# Patient Record
Sex: MALE | Race: WHITE | NOT HISPANIC OR LATINO
[De-identification: names, ages, dates, MRNs, and addresses within clinical notes are randomized per-mention and may not be internally consistent; named-entity substitution may affect disease eponyms.]

---

## 2023-04-28 ENCOUNTER — APPOINTMENT (OUTPATIENT)
Dept: FAMILY MEDICINE | Facility: CLINIC | Age: 73
End: 2023-04-28

## 2023-05-08 ENCOUNTER — APPOINTMENT (OUTPATIENT)
Dept: FAMILY MEDICINE | Facility: CLINIC | Age: 73
End: 2023-05-08
Payer: COMMERCIAL

## 2023-05-08 VITALS
WEIGHT: 152 LBS | TEMPERATURE: 97.3 F | BODY MASS INDEX: 24.43 KG/M2 | OXYGEN SATURATION: 99 % | HEART RATE: 77 BPM | SYSTOLIC BLOOD PRESSURE: 119 MMHG | HEIGHT: 66 IN | DIASTOLIC BLOOD PRESSURE: 78 MMHG

## 2023-05-08 DIAGNOSIS — Z80.0 FAMILY HISTORY OF MALIGNANT NEOPLASM OF DIGESTIVE ORGANS: ICD-10-CM

## 2023-05-08 DIAGNOSIS — Z86.59 PERSONAL HISTORY OF OTHER MENTAL AND BEHAVIORAL DISORDERS: ICD-10-CM

## 2023-05-08 DIAGNOSIS — Z63.4 DISAPPEARANCE AND DEATH OF FAMILY MEMBER: ICD-10-CM

## 2023-05-08 DIAGNOSIS — Z87.891 PERSONAL HISTORY OF NICOTINE DEPENDENCE: ICD-10-CM

## 2023-05-08 DIAGNOSIS — Z78.9 OTHER SPECIFIED HEALTH STATUS: ICD-10-CM

## 2023-05-08 DIAGNOSIS — Z82.49 FAMILY HISTORY OF ISCHEMIC HEART DISEASE AND OTHER DISEASES OF THE CIRCULATORY SYSTEM: ICD-10-CM

## 2023-05-08 PROCEDURE — 36415 COLL VENOUS BLD VENIPUNCTURE: CPT

## 2023-05-08 PROCEDURE — 99387 INIT PM E/M NEW PAT 65+ YRS: CPT | Mod: 25

## 2023-05-08 PROCEDURE — G0439: CPT | Mod: 25

## 2023-05-08 RX ORDER — OMEGA-3/DHA/EPA/FISH OIL 300-1000MG
CAPSULE ORAL
Refills: 0 | Status: ACTIVE | COMMUNITY

## 2023-05-08 SDOH — SOCIAL STABILITY - SOCIAL INSECURITY: DISSAPEARANCE AND DEATH OF FAMILY MEMBER: Z63.4

## 2023-05-08 NOTE — HISTORY OF PRESENT ILLNESS
[FreeTextEntry1] : establish care  [de-identified] : 74 yo m presents to establish care. \par No complaints today.

## 2023-05-08 NOTE — HEALTH RISK ASSESSMENT
[Good] : ~his/her~  mood as  good [Yes] : Yes [2 - 3 times a week (3 pts)] : 2 - 3  times a week (3 points) [1 or 2 (0 pts)] : 1 or 2 (0 points) [Never (0 pts)] : Never (0 points) [No] : In the past 12 months have you used drugs other than those required for medical reasons? No [0] : 2) Feeling down, depressed, or hopeless: Not at all (0) [PHQ-2 Negative - No further assessment needed] : PHQ-2 Negative - No further assessment needed [Audit-CScore] : 3 [de-identified] : exercises  [de-identified] : fruits, veggies  [[id number]] : 0 [Patient reported colonoscopy was normal] : Patient reported colonoscopy was normal [Hepatitis C test declined] : Hepatitis C test declined [Change in mental status noted] : No change in mental status noted [Language] : denies difficulty with language [None] : None [Alone] : lives alone [Employed] : employed [] :  [Sexually Active] : not sexually active [Feels Safe at Home] : Feels safe at home [Fully functional (bathing, dressing, toileting, transferring, walking, feeding)] : Fully functional (bathing, dressing, toileting, transferring, walking, feeding) [Fully functional (using the telephone, shopping, preparing meals, housekeeping, doing laundry, using] : Fully functional and needs no help or supervision to perform IADLs (using the telephone, shopping, preparing meals, housekeeping, doing laundry, using transportation, managing medications and managing finances) [Reports changes in hearing] : Reports no changes in hearing [Reports changes in vision] : Reports no changes in vision [ColonoscopyDate] : 08/21 [ColonoscopyComments] : follows 3 years regularly, has had polyps removed, fam hx of colon ca, due 8/2024 [FreeTextEntry2] :  at [employer], art   [Former] : Former [15-19] : 15-19 [> 15 Years] : > 15 Years

## 2023-05-16 LAB
25(OH)D3 SERPL-MCNC: 33 NG/ML
ALBUMIN SERPL ELPH-MCNC: 4.5 G/DL
ALP BLD-CCNC: 58 U/L
ALT SERPL-CCNC: 17 U/L
ANION GAP SERPL CALC-SCNC: 10 MMOL/L
AST SERPL-CCNC: 20 U/L
BASOPHILS # BLD AUTO: 0.07 K/UL
BASOPHILS NFR BLD AUTO: 1.2 %
BILIRUB SERPL-MCNC: 0.4 MG/DL
BUN SERPL-MCNC: 26 MG/DL
CALCIUM SERPL-MCNC: 10.1 MG/DL
CHLORIDE SERPL-SCNC: 105 MMOL/L
CHOLEST SERPL-MCNC: 158 MG/DL
CO2 SERPL-SCNC: 27 MMOL/L
CREAT SERPL-MCNC: 1.13 MG/DL
EGFR: 68 ML/MIN/1.73M2
EOSINOPHIL # BLD AUTO: 0.05 K/UL
EOSINOPHIL NFR BLD AUTO: 0.8 %
ESTIMATED AVERAGE GLUCOSE: 114 MG/DL
GLUCOSE SERPL-MCNC: 112 MG/DL
HBA1C MFR BLD HPLC: 5.6 %
HCT VFR BLD CALC: 45.7 %
HDLC SERPL-MCNC: 71 MG/DL
HGB BLD-MCNC: 14.3 G/DL
IMM GRANULOCYTES NFR BLD AUTO: 0.2 %
LDLC SERPL CALC-MCNC: 77 MG/DL
LYMPHOCYTES # BLD AUTO: 1.52 K/UL
LYMPHOCYTES NFR BLD AUTO: 25.2 %
MAN DIFF?: NORMAL
MCHC RBC-ENTMCNC: 30.1 PG
MCHC RBC-ENTMCNC: 31.3 GM/DL
MCV RBC AUTO: 96.2 FL
MONOCYTES # BLD AUTO: 0.57 K/UL
MONOCYTES NFR BLD AUTO: 9.4 %
NEUTROPHILS # BLD AUTO: 3.82 K/UL
NEUTROPHILS NFR BLD AUTO: 63.2 %
NONHDLC SERPL-MCNC: 88 MG/DL
PLATELET # BLD AUTO: 219 K/UL
POTASSIUM SERPL-SCNC: 4.8 MMOL/L
PROT SERPL-MCNC: 6.6 G/DL
PSA SERPL-MCNC: 4.21 NG/ML
RBC # BLD: 4.75 M/UL
RBC # FLD: 14.7 %
SODIUM SERPL-SCNC: 142 MMOL/L
TRIGL SERPL-MCNC: 53 MG/DL
TSH SERPL-ACNC: 1.81 UIU/ML
WBC # FLD AUTO: 6.04 K/UL

## 2023-05-17 ENCOUNTER — NON-APPOINTMENT (OUTPATIENT)
Age: 73
End: 2023-05-17

## 2023-05-17 ENCOUNTER — APPOINTMENT (OUTPATIENT)
Dept: HEART AND VASCULAR | Facility: CLINIC | Age: 73
End: 2023-05-17
Payer: COMMERCIAL

## 2023-05-17 VITALS
OXYGEN SATURATION: 98 % | TEMPERATURE: 97.5 F | HEART RATE: 76 BPM | BODY MASS INDEX: 24.43 KG/M2 | WEIGHT: 152 LBS | SYSTOLIC BLOOD PRESSURE: 116 MMHG | DIASTOLIC BLOOD PRESSURE: 72 MMHG | HEIGHT: 66 IN

## 2023-05-17 PROCEDURE — 99204 OFFICE O/P NEW MOD 45 MIN: CPT | Mod: 25

## 2023-05-17 PROCEDURE — 93000 ELECTROCARDIOGRAM COMPLETE: CPT

## 2023-05-17 NOTE — DISCUSSION/SUMMARY
[FreeTextEntry1] : SOB check a stress echo if able to approve and schedule.\par HLD cont statin, check Ca score \par EKG section of the chart --- secondary to symptoms above an electrocardiogram also known as an EKG was performed.  Risks and benefits discussed with the patient. Patient was given time and privacy to changed into a gown. Shortly after, standard 10 leads were applied and a [employer] system was used to perform the study. The results were subsequently reviewed by attending physician and discussed with the patient. The study showed a normal sinus rhythm and no ST-T suggestive of ischemia. Order for the EKG was placed in the chart. The results were documented. Billing submitted. Emotional support provided.\par  [EKG obtained to assist in diagnosis and management of assessed problem(s)] : EKG obtained to assist in diagnosis and management of assessed problem(s)

## 2023-05-17 NOTE — REASON FOR VISIT
[Symptom and Test Evaluation] : symptom and test evaluation [Hyperlipidemia] : hyperlipidemia [FreeTextEntry1] : 73 year old man comes in for a visit. He is concerned about heart disease. No recent cardiac testing. He is quite active. Occasional dyspnea noted. This is often noted with activity. Symptoms always improve at rest. We are discussing cardiac risk stratification.

## 2023-05-19 ENCOUNTER — NON-APPOINTMENT (OUTPATIENT)
Age: 73
End: 2023-05-19

## 2023-07-05 ENCOUNTER — APPOINTMENT (OUTPATIENT)
Dept: CT IMAGING | Facility: CLINIC | Age: 73
End: 2023-07-05
Payer: SELF-PAY

## 2023-07-05 ENCOUNTER — OUTPATIENT (OUTPATIENT)
Dept: OUTPATIENT SERVICES | Facility: HOSPITAL | Age: 73
LOS: 1 days | End: 2023-07-05

## 2023-07-05 ENCOUNTER — APPOINTMENT (OUTPATIENT)
Dept: HEART AND VASCULAR | Facility: CLINIC | Age: 73
End: 2023-07-05
Payer: COMMERCIAL

## 2023-07-05 VITALS
TEMPERATURE: 97.9 F | SYSTOLIC BLOOD PRESSURE: 120 MMHG | HEIGHT: 66 IN | HEART RATE: 77 BPM | BODY MASS INDEX: 24.65 KG/M2 | OXYGEN SATURATION: 100 % | WEIGHT: 153.38 LBS | DIASTOLIC BLOOD PRESSURE: 77 MMHG

## 2023-07-05 PROCEDURE — 93351 STRESS TTE COMPLETE: CPT

## 2023-07-05 PROCEDURE — 75571 CT HRT W/O DYE W/CA TEST: CPT | Mod: 26

## 2023-07-05 PROCEDURE — 99214 OFFICE O/P EST MOD 30 MIN: CPT

## 2023-07-06 NOTE — DISCUSSION/SUMMARY
[FreeTextEntry1] : SOB stress echo reviewed. Inclined towards a conservative follow up in this patient. We had a careful discussion regarding diet and exercise. Will be happy to re-evaluate.\par HLD ca score reviewed, refill statin and check carotid u/s if able to approve and schedule.\par

## 2023-07-06 NOTE — REASON FOR VISIT
[Symptom and Test Evaluation] : symptom and test evaluation [FreeTextEntry1] : 73 year old man comes in for a visit. He is concerned about heart disease. No recent cardiac testing. He is quite active. Occasional dyspnea noted. This is often noted with activity. Symptoms always improve at rest. We are discussing cardiac risk stratification. He completed a stress echo and ca score.

## 2023-08-07 ENCOUNTER — APPOINTMENT (OUTPATIENT)
Dept: HEART AND VASCULAR | Facility: CLINIC | Age: 73
End: 2023-08-07

## 2023-08-08 ENCOUNTER — APPOINTMENT (OUTPATIENT)
Dept: HEART AND VASCULAR | Facility: CLINIC | Age: 73
End: 2023-08-08
Payer: COMMERCIAL

## 2023-08-08 VITALS
HEIGHT: 66 IN | SYSTOLIC BLOOD PRESSURE: 126 MMHG | WEIGHT: 152 LBS | DIASTOLIC BLOOD PRESSURE: 77 MMHG | HEART RATE: 81 BPM | BODY MASS INDEX: 24.43 KG/M2 | OXYGEN SATURATION: 98 %

## 2023-08-08 PROCEDURE — 99214 OFFICE O/P EST MOD 30 MIN: CPT

## 2023-08-08 PROCEDURE — 93880 EXTRACRANIAL BILAT STUDY: CPT

## 2023-08-09 NOTE — DISCUSSION/SUMMARY
[FreeTextEntry1] : CAD check u/s abn AAA screen if able to approve and schedule. SOB stress test reviewed. Inclined towards a conservative follow up in this patient. We had a careful discussion regarding diet and exercise. Will be happy to re-evaluate. MICHELET PERRY and I discussed his lipid panel and individualized target LDL goal. At this point, will do diet and exercise with anticipation of re-evaluating labs in 3-6 months

## 2023-09-12 ENCOUNTER — NON-APPOINTMENT (OUTPATIENT)
Age: 73
End: 2023-09-12

## 2023-09-18 ENCOUNTER — EMERGENCY (EMERGENCY)
Facility: HOSPITAL | Age: 73
LOS: 1 days | Discharge: HOME | End: 2023-09-18
Admitting: EMERGENCY MEDICINE
Payer: COMMERCIAL

## 2023-09-18 VITALS
SYSTOLIC BLOOD PRESSURE: 119 MMHG | OXYGEN SATURATION: 98 % | RESPIRATION RATE: 18 BRPM | WEIGHT: 149.91 LBS | TEMPERATURE: 98 F | HEART RATE: 80 BPM | DIASTOLIC BLOOD PRESSURE: 78 MMHG

## 2023-09-18 DIAGNOSIS — L29.9 PRURITUS, UNSPECIFIED: ICD-10-CM

## 2023-09-18 DIAGNOSIS — R21 RASH AND OTHER NONSPECIFIC SKIN ERUPTION: ICD-10-CM

## 2023-09-18 PROCEDURE — 99284 EMERGENCY DEPT VISIT MOD MDM: CPT

## 2023-09-18 NOTE — ED PROVIDER NOTE - OBJECTIVE STATEMENT
74 y/o M with no PMH presents to the ED for a rash to the L forearm x1 week that is worsening. Pt now notes similar rash to B/L medial thighs. He initially went to Urgent Care and was prescribed topical Triamcinolone which helped with itchiness but did not resolve the rash. Pt believes he was exposed to something a work but is unsure. He denies fevers, chills, pain, or swelling.

## 2023-09-18 NOTE — ED PROVIDER NOTE - PATIENT PORTAL LINK FT
You can access the FollowMyHealth Patient Portal offered by [facility] by registering at the following website: [email or web address]. By joining [facility]’s FollowMyHealth portal, you will also be able to view your health information using other applications (apps) compatible with our system.

## 2023-09-18 NOTE — ED ADULT TRIAGE NOTE - CHIEF COMPLAINT QUOTE
worsening pruritic rash to lt forearm spreading to bilat inner thigh, originally seen at [facility] dx dermatitis given steroid cream without relief. no known allergies

## 2023-09-18 NOTE — ED PROVIDER NOTE - SKIN, MLM
Maculopapular slightly raised erythematous rash along dorsum of L forearm with small papular rash to distal B/L medial thigh. No excoriations. Areas are blanchable.

## 2023-09-18 NOTE — ED ADULT NURSE NOTE - CHIEF COMPLAINT QUOTE
worsening pruritic rash to lt forearm spreading to bilat inner thigh, originally seen at [facility] dx dermatitis given steroid cream without relief.

## 2023-09-18 NOTE — ED PROVIDER NOTE - CLINICAL SUMMARY MEDICAL DECISION MAKING FREE TEXT BOX
Pt presenting with rash x1 week. The rash does not have a specific infectious appearance. Pt denies any pain. Pt reports some improvement with itchiness after using topical ointment but no resolution of rash. Plan for short course of oral steroids and follow up with dermatology if there is no improvement. Will forward information to . Stable for discharge home.

## 2023-09-21 ENCOUNTER — APPOINTMENT (OUTPATIENT)
Dept: FAMILY MEDICINE | Facility: CLINIC | Age: 73
End: 2023-09-21
Payer: COMMERCIAL

## 2023-09-21 VITALS
WEIGHT: 152.31 LBS | DIASTOLIC BLOOD PRESSURE: 72 MMHG | BODY MASS INDEX: 24.48 KG/M2 | HEART RATE: 72 BPM | OXYGEN SATURATION: 98 % | HEIGHT: 66 IN | TEMPERATURE: 98.4 F | SYSTOLIC BLOOD PRESSURE: 126 MMHG

## 2023-09-21 DIAGNOSIS — R21 RASH AND OTHER NONSPECIFIC SKIN ERUPTION: ICD-10-CM

## 2023-09-21 PROCEDURE — 36415 COLL VENOUS BLD VENIPUNCTURE: CPT

## 2023-09-21 PROCEDURE — 99214 OFFICE O/P EST MOD 30 MIN: CPT | Mod: 25

## 2023-09-27 LAB
ALBUMIN SERPL ELPH-MCNC: 4.5 G/DL
ALP BLD-CCNC: 59 U/L
ALT SERPL-CCNC: 16 U/L
ANION GAP SERPL CALC-SCNC: 12 MMOL/L
AST SERPL-CCNC: 19 U/L
BILIRUB SERPL-MCNC: 0.2 MG/DL
BUN SERPL-MCNC: 27 MG/DL
CALCIUM SERPL-MCNC: 9.8 MG/DL
CHLORIDE SERPL-SCNC: 103 MMOL/L
CHOLEST SERPL-MCNC: 165 MG/DL
CO2 SERPL-SCNC: 24 MMOL/L
CREAT SERPL-MCNC: 1.15 MG/DL
EGFR: 67 ML/MIN/1.73M2
GLUCOSE SERPL-MCNC: 110 MG/DL
HDLC SERPL-MCNC: 88 MG/DL
LDLC SERPL CALC-MCNC: 65 MG/DL
NONHDLC SERPL-MCNC: 78 MG/DL
POTASSIUM SERPL-SCNC: 4.4 MMOL/L
PROT SERPL-MCNC: 6.8 G/DL
PSA FREE FLD-MCNC: 25 %
PSA FREE SERPL-MCNC: 1.1 NG/ML
PSA SERPL-MCNC: 4.46 NG/ML
SODIUM SERPL-SCNC: 139 MMOL/L
TRIGL SERPL-MCNC: 68 MG/DL

## 2023-10-10 ENCOUNTER — TRANSCRIPTION ENCOUNTER (OUTPATIENT)
Age: 73
End: 2023-10-10

## 2023-10-10 ENCOUNTER — APPOINTMENT (OUTPATIENT)
Dept: UROLOGY | Facility: CLINIC | Age: 73
End: 2023-10-10
Payer: COMMERCIAL

## 2023-10-10 VITALS
HEIGHT: 67.5 IN | BODY MASS INDEX: 23.42 KG/M2 | WEIGHT: 151 LBS | HEART RATE: 72 BPM | DIASTOLIC BLOOD PRESSURE: 73 MMHG | SYSTOLIC BLOOD PRESSURE: 121 MMHG | TEMPERATURE: 98.3 F | OXYGEN SATURATION: 98 %

## 2023-10-10 DIAGNOSIS — R31.29 OTHER MICROSCOPIC HEMATURIA: ICD-10-CM

## 2023-10-10 LAB
BILIRUB UR QL STRIP: NORMAL
CLARITY UR: CLEAR
COLLECTION METHOD: NORMAL
GLUCOSE UR-MCNC: NORMAL
HCG UR QL: 0.2 EU/DL
HGB UR QL STRIP.AUTO: ABNORMAL
KETONES UR-MCNC: NORMAL
LEUKOCYTE ESTERASE UR QL STRIP: NORMAL
NITRITE UR QL STRIP: NORMAL
PH UR STRIP: 6
PROT UR STRIP-MCNC: NORMAL
SP GR UR STRIP: 1.01

## 2023-10-10 PROCEDURE — 81003 URINALYSIS AUTO W/O SCOPE: CPT | Mod: QW

## 2023-10-10 PROCEDURE — 99204 OFFICE O/P NEW MOD 45 MIN: CPT

## 2023-10-11 LAB
APPEARANCE: CLEAR
BACTERIA: NEGATIVE /HPF
BILIRUBIN URINE: NEGATIVE
BLOOD URINE: NEGATIVE
CAST: 0 /LPF
COLOR: YELLOW
EPITHELIAL CELLS: 0 /HPF
GLUCOSE QUALITATIVE U: NEGATIVE MG/DL
KETONES URINE: NEGATIVE MG/DL
LEUKOCYTE ESTERASE URINE: NEGATIVE
MICROSCOPIC-UA: NORMAL
NITRITE URINE: NEGATIVE
PH URINE: 6.5
PROTEIN URINE: NEGATIVE MG/DL
RED BLOOD CELLS URINE: 1 /HPF
SPECIFIC GRAVITY URINE: 1.01
UROBILINOGEN URINE: 0.2 MG/DL
WHITE BLOOD CELLS URINE: 0 /HPF

## 2023-10-12 LAB — BACTERIA UR CULT: NORMAL

## 2023-10-13 ENCOUNTER — OUTPATIENT (OUTPATIENT)
Dept: OUTPATIENT SERVICES | Facility: HOSPITAL | Age: 73
LOS: 1 days | End: 2023-10-13

## 2023-10-13 ENCOUNTER — RESULT REVIEW (OUTPATIENT)
Age: 73
End: 2023-10-13

## 2023-10-13 ENCOUNTER — APPOINTMENT (OUTPATIENT)
Dept: MRI IMAGING | Facility: CLINIC | Age: 73
End: 2023-10-13
Payer: COMMERCIAL

## 2023-10-13 PROCEDURE — 72197 MRI PELVIS W/O & W/DYE: CPT | Mod: 26

## 2023-10-13 PROCEDURE — 76498P: CUSTOM | Mod: 26

## 2023-10-18 ENCOUNTER — NON-APPOINTMENT (OUTPATIENT)
Age: 73
End: 2023-10-18

## 2023-10-20 ENCOUNTER — APPOINTMENT (OUTPATIENT)
Dept: UROLOGY | Facility: CLINIC | Age: 73
End: 2023-10-20
Payer: COMMERCIAL

## 2023-10-20 PROCEDURE — 99214 OFFICE O/P EST MOD 30 MIN: CPT | Mod: 95

## 2023-12-07 ENCOUNTER — APPOINTMENT (OUTPATIENT)
Dept: INTERNAL MEDICINE | Facility: CLINIC | Age: 73
End: 2023-12-07
Payer: COMMERCIAL

## 2023-12-07 DIAGNOSIS — Z00.00 ENCOUNTER FOR GENERAL ADULT MEDICAL EXAMINATION W/OUT ABNORMAL FINDINGS: ICD-10-CM

## 2023-12-07 DIAGNOSIS — Z01.818 ENCOUNTER FOR OTHER PREPROCEDURAL EXAMINATION: ICD-10-CM

## 2023-12-07 DIAGNOSIS — B20 HUMAN IMMUNODEFICIENCY VIRUS [HIV] DISEASE: ICD-10-CM

## 2023-12-07 PROCEDURE — 99214 OFFICE O/P EST MOD 30 MIN: CPT | Mod: 25

## 2023-12-07 PROCEDURE — 36415 COLL VENOUS BLD VENIPUNCTURE: CPT

## 2023-12-07 RX ORDER — METHYLPREDNISOLONE 4 MG/1
4 TABLET ORAL
Qty: 1 | Refills: 0 | Status: DISCONTINUED | COMMUNITY
Start: 2023-09-21 | End: 2023-12-07

## 2023-12-08 LAB
ALBUMIN SERPL ELPH-MCNC: 4.7 G/DL
ALP BLD-CCNC: 65 U/L
ALT SERPL-CCNC: 18 U/L
ANION GAP SERPL CALC-SCNC: 12 MMOL/L
APPEARANCE: CLEAR
AST SERPL-CCNC: 20 U/L
BILIRUB SERPL-MCNC: 0.4 MG/DL
BILIRUBIN URINE: NEGATIVE
BLOOD URINE: NEGATIVE
BUN SERPL-MCNC: 25 MG/DL
CALCIUM SERPL-MCNC: 10.2 MG/DL
CD3 CELLS # BLD: 1076 CELLS/UL
CD3 CELLS NFR BLD: 81 %
CD3+CD4+ CELLS # BLD: 483 CELLS/UL
CD3+CD4+ CELLS NFR BLD: 36 %
CD3+CD4+ CELLS/CD3+CD8+ CLL SPEC: 0.84 RATIO
CD3+CD8+ CELLS # SPEC: 574 CELLS/UL
CD3+CD8+ CELLS NFR BLD: 43 %
CHLORIDE SERPL-SCNC: 103 MMOL/L
CO2 SERPL-SCNC: 25 MMOL/L
COLOR: YELLOW
CREAT SERPL-MCNC: 1.08 MG/DL
EGFR: 72 ML/MIN/1.73M2
ESTIMATED AVERAGE GLUCOSE: 114 MG/DL
GLUCOSE QUALITATIVE U: NEGATIVE MG/DL
GLUCOSE SERPL-MCNC: 117 MG/DL
HBA1C MFR BLD HPLC: 5.6 %
HCT VFR BLD CALC: 44.9 %
HGB BLD-MCNC: 15 G/DL
HIV1 RNA # SERPL NAA+PROBE: NORMAL
HIV1 RNA # SERPL NAA+PROBE: NORMAL COPIES/ML
KETONES URINE: NEGATIVE MG/DL
LEUKOCYTE ESTERASE URINE: NEGATIVE
MCHC RBC-ENTMCNC: 30.9 PG
MCHC RBC-ENTMCNC: 33.4 GM/DL
MCV RBC AUTO: 92.4 FL
NITRITE URINE: NEGATIVE
PH URINE: 6.5
PLATELET # BLD AUTO: 238 K/UL
POTASSIUM SERPL-SCNC: 4.3 MMOL/L
PROT SERPL-MCNC: 7 G/DL
PROTEIN URINE: NEGATIVE MG/DL
PSA FREE FLD-MCNC: 27 %
PSA FREE SERPL-MCNC: 1.18 NG/ML
PSA SERPL-MCNC: 4.32 NG/ML
RBC # BLD: 4.86 M/UL
RBC # FLD: 13.4 %
SODIUM SERPL-SCNC: 141 MMOL/L
SPECIFIC GRAVITY URINE: 1.01
UROBILINOGEN URINE: 0.2 MG/DL
VIRAL LOAD INTERP: NORMAL
VIRAL LOAD LOG: NORMAL LG COP/ML
WBC # FLD AUTO: 7.26 K/UL

## 2023-12-11 ENCOUNTER — OUTPATIENT (OUTPATIENT)
Dept: OUTPATIENT SERVICES | Facility: HOSPITAL | Age: 73
LOS: 1 days | End: 2023-12-11
Payer: COMMERCIAL

## 2023-12-11 PROCEDURE — 76377 3D RENDER W/INTRP POSTPROCES: CPT | Mod: 26

## 2023-12-15 ENCOUNTER — NON-APPOINTMENT (OUTPATIENT)
Age: 73
End: 2023-12-15

## 2023-12-15 ENCOUNTER — APPOINTMENT (OUTPATIENT)
Dept: HEART AND VASCULAR | Facility: CLINIC | Age: 73
End: 2023-12-15
Payer: COMMERCIAL

## 2023-12-15 VITALS
WEIGHT: 151 LBS | BODY MASS INDEX: 23.42 KG/M2 | DIASTOLIC BLOOD PRESSURE: 66 MMHG | HEART RATE: 77 BPM | SYSTOLIC BLOOD PRESSURE: 111 MMHG | HEIGHT: 67.5 IN | OXYGEN SATURATION: 98 %

## 2023-12-15 PROCEDURE — 93000 ELECTROCARDIOGRAM COMPLETE: CPT

## 2023-12-15 PROCEDURE — 99214 OFFICE O/P EST MOD 30 MIN: CPT | Mod: 25

## 2023-12-15 NOTE — DISCUSSION/SUMMARY
[Procedure Low Risk] : the procedure risk is low [Patient Low Risk] : the patient is a low surgical risk [Optimized for Surgery] : the patient is optimized for surgery [FreeTextEntry1] : Pre-op From cardiovascular standpoint, Mr. CAUSEY is of acceptable risk for the planned procedure and may move forward without further cardiovascular testing. HLD cont statin EKG SR NO ST T changes

## 2023-12-15 NOTE — ASU PATIENT PROFILE, ADULT - NS PREOP UNDERSTANDS INFO2
No solid food/dairy/candy/gum after midnight Sunday; water allowed before 09:00am Monday; patient reminded to come with photo ID/insurance/credit card; dress comfortable; no jewelries/contact/lens/valuables; no smoking/drinking alcohol/recreational drug use Sunday; escort must have a photo ID; address and callback number given./yes

## 2023-12-15 NOTE — ASU PATIENT PROFILE, ADULT - NSICDXPASTMEDICALHX_GEN_ALL_CORE_FT
PAST MEDICAL HISTORY:  Hyperlipidemia      PAST MEDICAL HISTORY:  Elevated PSA     HIV disease     Hyperlipidemia

## 2023-12-15 NOTE — ASU PATIENT PROFILE, ADULT - FALL HARM RISK - UNIVERSAL INTERVENTIONS
Bed in lowest position, wheels locked, appropriate side rails in place/Call bell, personal items and telephone in reach/Instruct patient to call for assistance before getting out of bed or chair/Non-slip footwear when patient is out of bed/[place] to call system/Physically safe environment - no spills, clutter or unnecessary equipment/Purposeful Proactive Rounding/Room/bathroom lighting operational, light cord in reach Bed in lowest position, wheels locked, appropriate side rails in place/Call bell, personal items and telephone in reach/Instruct patient to call for assistance before getting out of bed or chair/Non-slip footwear when patient is out of bed/[place] to call system/Physically safe environment - no spills, clutter or unnecessary equipment/Purposeful Proactive Rounding/Room/bathroom lighting operational, light cord in reach

## 2023-12-15 NOTE — HISTORY OF PRESENT ILLNESS
[Preoperative Visit] : for a medical evaluation prior to surgery [Scheduled Procedure ___] : a [unfilled] [Date of Surgery ___] : on [unfilled] [Surgeon Name ___] : surgeon: [unfilled] [Fever] : no fever [Chills] : no chills [Fatigue] : no fatigue [Chest Pain] : no chest pain [Cough] : no cough [Dyspnea] : no dyspnea [Dysuria] : no dysuria [Urinary Frequency] : no urinary frequency [Nausea] : no nausea [Vomiting] : no vomiting [Diarrhea] : no diarrhea [Diabetes] : no diabetes [Cardiovascular Disease] : no cardiovascular disease [Pulmonary Disease] : no pulmonary disease [Anti-Platelet Agents] : no anti-platelet agents [Nicotine Dependence] : no nicotine dependence [Alcohol Use] : no  alcohol use [Renal Disease] : no renal disease [GI Disease] : no gastrointestinal disease [Sleep Apnea] : no sleep apnea [Thromboembolic Problems] : no thromboembolic problems [Frequent use of NSAIDs] : no use of NSAIDs [Transfusion Reaction] : no transfusion reaction [Impaired Immunity] : no impaired immunity [Steroid Use in Last 6 Months] : no steroid use in the last six months [Frequent Aspirin Use] : no frequent aspirin use [Prior Anesthesia] : Prior anesthesia [Prev Anesthesia Reaction] : no previous anesthesia reaction [Electrocardiogram] : ~T an ECG ~C was performed [Echocardiogram] : ~T an echocardiogram ~C was performed [Cardiovascular Stress Test] : a cardiac stress test ~T ~C was performed [Good] : Good [Anesthesia Reaction] : no anesthesia reaction [Sudden Death] : no sudden death [Clotting Disorder] : no clotting disorder [Bleeding Disorder] : no bleeding disorder [de-identified] : Dr Douglass [FreeTextEntry1] : Mr. CAUSEY presents for a follow up today. He denies chest pain, dyspnea or palpitations. No issues reported with his medications. Otherwise, he is feeling well.

## 2023-12-17 ENCOUNTER — TRANSCRIPTION ENCOUNTER (OUTPATIENT)
Age: 73
End: 2023-12-17

## 2023-12-18 ENCOUNTER — APPOINTMENT (OUTPATIENT)
Dept: UROLOGY | Facility: AMBULATORY SURGERY CENTER | Age: 73
End: 2023-12-18

## 2023-12-18 ENCOUNTER — TRANSCRIPTION ENCOUNTER (OUTPATIENT)
Age: 73
End: 2023-12-18

## 2023-12-18 ENCOUNTER — RESULT REVIEW (OUTPATIENT)
Age: 73
End: 2023-12-18

## 2023-12-18 ENCOUNTER — OUTPATIENT (OUTPATIENT)
Dept: OUTPATIENT SERVICES | Facility: HOSPITAL | Age: 73
LOS: 1 days | Discharge: HOME | End: 2023-12-18
Payer: COMMERCIAL

## 2023-12-18 VITALS
SYSTOLIC BLOOD PRESSURE: 138 MMHG | TEMPERATURE: 98 F | OXYGEN SATURATION: 99 % | DIASTOLIC BLOOD PRESSURE: 84 MMHG | HEART RATE: 68 BPM | RESPIRATION RATE: 16 BRPM

## 2023-12-18 VITALS
HEIGHT: 67.5 IN | SYSTOLIC BLOOD PRESSURE: 120 MMHG | OXYGEN SATURATION: 99 % | WEIGHT: 152.12 LBS | HEART RATE: 65 BPM | TEMPERATURE: 98 F | RESPIRATION RATE: 18 BRPM | DIASTOLIC BLOOD PRESSURE: 65 MMHG

## 2023-12-18 DIAGNOSIS — Z98.890 OTHER SPECIFIED POSTPROCEDURAL STATES: Chronic | ICD-10-CM

## 2023-12-18 PROCEDURE — 76999F: CUSTOM | Mod: 26

## 2023-12-18 PROCEDURE — 55700: CPT

## 2023-12-18 PROCEDURE — G0416: CPT | Mod: 26

## 2023-12-18 RX ORDER — ROSUVASTATIN CALCIUM 5 MG/1
1 TABLET ORAL
Refills: 0 | DISCHARGE

## 2023-12-18 RX ORDER — FENTANYL CITRATE 50 UG/ML
25 INJECTION INTRAVENOUS
Refills: 0 | Status: DISCONTINUED | OUTPATIENT
Start: 2023-12-18 | End: 2023-12-18

## 2023-12-18 RX ORDER — SODIUM CHLORIDE 9 MG/ML
1000 INJECTION, SOLUTION INTRAVENOUS
Refills: 0 | Status: DISCONTINUED | OUTPATIENT
Start: 2023-12-18 | End: 2023-12-18

## 2023-12-18 RX ORDER — ASPIRIN/CALCIUM CARB/MAGNESIUM 324 MG
1 TABLET ORAL
Refills: 0 | DISCHARGE

## 2023-12-18 RX ORDER — CHONDROITIN SULFATE A SODIUM 100 %
0 POWDER (GRAM) MISCELLANEOUS
Refills: 0 | DISCHARGE

## 2023-12-18 RX ADMIN — SODIUM CHLORIDE 100 MILLILITER(S): 9 INJECTION, SOLUTION INTRAVENOUS at 14:05

## 2023-12-18 NOTE — BRIEF OPERATIVE NOTE - NSICDXBRIEFPROCEDURE_GEN_ALL_CORE_FT
PROCEDURES:  Biopsy, prostate, with integrated MRI-transrectal US guidance 18-Dec-2023 14:06:54  Lilli Root

## 2023-12-18 NOTE — ASU DISCHARGE PLAN (ADULT/PEDIATRIC) - ASU DC SPECIAL INSTRUCTIONSFT
PROSTATE BIOPSY    GENERAL: Expect blood in the urine, stool, and ejaculate for up to two (2) months postoperatively. This is normal and to be expected after this procedure. Increase hydration to keep the urine as clear as possible.    SURGICAL WOUND: There are often lumps and bumps that can be felt in the perineum (skin between scrotum and anus) for up to two (2) months or more post operatively. These are of no concern and with time they will soften and disappear.  Any “black and blue” bruising areas will also resolve.  You may apply an ice-pack for 15 minutes out of every hour for the first 24 -36 hours to minimize pain and swelling. You may apply over the counter Bacitracin to the wound several times a day, and keep covered with over the counter gauze as necessary.    CATHETER: Some patients are sent home with a Yañez catheter, while others go home urinating on their own. A Yañez catheter continuously drains the urine from the bladder. If you still have a catheter, the nurses will review instructions and care before you go home. For men, you may have a prescription for lidocaine jelly to apply to the tip of your penis, as needed, for catheter related discomfort.     PAIN: You may have some intermittent pain for up to six (6) weeks post operatively. Pain does not signify any problem unless associated with fever, chills, or inability to void.  If you experience any fevers or chills please call immediately as this may be signs of an infection. You may take Tylenol (acetaminophen) 650-975mg and/or Motrin (ibuprofen) 400-600mg, both available over the counter, for pain every 6 hours as needed. Do not exceed 4000mg of Tylenol (acetaminophen) daily. You may alternate these medications such that you take one or the other every 3 hours for around the clock pain coverage.     ANTICOAGULATION: If you are taking any blood thinning medications, please discuss with your urologist prior to restarting these medications unless otherwise specified.    BATHING: You may shower with soap and water, and pat dry the needle insertion sites in the perineum. Avoid sitting in water for prolonged periods of time for the next few days.    DIET: You may resume your regular diet and regular medication regimen.    ACTIVITY: No heavy lifting or strenuous exercise until you are evaluated at your post-operative appointment. Otherwise, you may return to your usual level of physical activity.    FOLLOW-UP: If you did not already schedule your post-operative appointment, please call your urologist to schedule and follow-up appointment.    CALL YOUR UROLOGIST IF: You have any bleeding that does not stop, inability to void >8 hours, fever over 100.4 F, chills, persistent nausea/vomiting, changes in your incision concerning for infection, or if your pain is not controlled on your discharge pain medications.

## 2023-12-18 NOTE — PRE-ANESTHESIA EVALUATION ADULT - NSANTHOSAYNRD_GEN_A_CORE
No. OBIE screening performed.  STOP BANG Legend: 0-2 = LOW Risk; 3-4 = INTERMEDIATE Risk; 5-8 = HIGH Risk

## 2023-12-18 NOTE — ASU DISCHARGE PLAN (ADULT/PEDIATRIC) - NS MD DC FALL RISK RISK
For information on Fall & Injury Prevention, visit: [email or web address] OR  [email or web address] OR  https://www.cdc.gov/steadi/patient.html For information on Fall & Injury Prevention, visit: [email or web address] OR  [email or web address] OR  https://www.cdc.gov/steadi/patient.html

## 2023-12-19 ENCOUNTER — NON-APPOINTMENT (OUTPATIENT)
Age: 73
End: 2023-12-19

## 2023-12-21 LAB
SURGICAL PATHOLOGY STUDY: SIGNIFICANT CHANGE UP
SURGICAL PATHOLOGY STUDY: SIGNIFICANT CHANGE UP

## 2023-12-22 ENCOUNTER — NON-APPOINTMENT (OUTPATIENT)
Age: 73
End: 2023-12-22

## 2023-12-22 PROBLEM — R97.20 ELEVATED PROSTATE SPECIFIC ANTIGEN [PSA]: Chronic | Status: ACTIVE | Noted: 2023-12-18

## 2023-12-22 PROBLEM — E78.5 HYPERLIPIDEMIA, UNSPECIFIED: Chronic | Status: ACTIVE | Noted: 2023-12-15

## 2023-12-22 PROBLEM — B20 HUMAN IMMUNODEFICIENCY VIRUS [HIV] DISEASE: Chronic | Status: ACTIVE | Noted: 2023-12-18

## 2024-02-23 ENCOUNTER — APPOINTMENT (OUTPATIENT)
Dept: FAMILY MEDICINE | Facility: CLINIC | Age: 74
End: 2024-02-23
Payer: COMMERCIAL

## 2024-02-23 VITALS
WEIGHT: 153 LBS | DIASTOLIC BLOOD PRESSURE: 81 MMHG | BODY MASS INDEX: 23.73 KG/M2 | HEIGHT: 67.5 IN | HEART RATE: 64 BPM | TEMPERATURE: 97.8 F | OXYGEN SATURATION: 98 % | SYSTOLIC BLOOD PRESSURE: 135 MMHG

## 2024-02-23 DIAGNOSIS — F40.243 FEAR OF FLYING: ICD-10-CM

## 2024-02-23 PROCEDURE — 99214 OFFICE O/P EST MOD 30 MIN: CPT

## 2024-02-23 PROCEDURE — G2211 COMPLEX E/M VISIT ADD ON: CPT

## 2024-02-23 PROCEDURE — 36415 COLL VENOUS BLD VENIPUNCTURE: CPT

## 2024-02-23 RX ORDER — TRIAZOLAM 0.12 MG/1
0.12 TABLET ORAL
Qty: 21 | Refills: 0 | Status: ACTIVE | COMMUNITY
Start: 2024-02-23 | End: 1900-01-01

## 2024-02-27 LAB
ALBUMIN SERPL ELPH-MCNC: 4.5 G/DL
ALP BLD-CCNC: 54 U/L
ALT SERPL-CCNC: 15 U/L
ANION GAP SERPL CALC-SCNC: 19 MMOL/L
AST SERPL-CCNC: 18 U/L
BILIRUB SERPL-MCNC: 0.3 MG/DL
BUN SERPL-MCNC: 25 MG/DL
CALCIUM SERPL-MCNC: 9.9 MG/DL
CHLORIDE SERPL-SCNC: 102 MMOL/L
CHOLEST SERPL-MCNC: 152 MG/DL
CO2 SERPL-SCNC: 20 MMOL/L
CREAT SERPL-MCNC: 1.07 MG/DL
EGFR: 73 ML/MIN/1.73M2
ESTIMATED AVERAGE GLUCOSE: 114 MG/DL
GLUCOSE SERPL-MCNC: 116 MG/DL
HBA1C MFR BLD HPLC: 5.6 %
HDLC SERPL-MCNC: 67 MG/DL
LDLC SERPL CALC-MCNC: 69 MG/DL
NONHDLC SERPL-MCNC: 85 MG/DL
POTASSIUM SERPL-SCNC: 5 MMOL/L
PROT SERPL-MCNC: 6.6 G/DL
SODIUM SERPL-SCNC: 142 MMOL/L
TRIGL SERPL-MCNC: 88 MG/DL

## 2024-03-05 RX ORDER — NIRMATRELVIR AND RITONAVIR 300-100 MG
20 X 150 MG & KIT ORAL
Qty: 1 | Refills: 0 | Status: ACTIVE | COMMUNITY
Start: 2024-03-05 | End: 1900-01-01

## 2024-03-19 ENCOUNTER — APPOINTMENT (OUTPATIENT)
Dept: UROLOGY | Facility: CLINIC | Age: 74
End: 2024-03-19

## 2024-03-20 ENCOUNTER — APPOINTMENT (OUTPATIENT)
Dept: UROLOGY | Facility: CLINIC | Age: 74
End: 2024-03-20
Payer: COMMERCIAL

## 2024-03-20 VITALS
HEART RATE: 74 BPM | DIASTOLIC BLOOD PRESSURE: 86 MMHG | TEMPERATURE: 98.3 F | WEIGHT: 153 LBS | BODY MASS INDEX: 23.73 KG/M2 | HEIGHT: 67.5 IN | SYSTOLIC BLOOD PRESSURE: 136 MMHG

## 2024-03-20 DIAGNOSIS — R97.20 ELEVATED PROSTATE, SPECIFIC ANTIGEN [PSA]: ICD-10-CM

## 2024-03-20 DIAGNOSIS — R39.9 UNSPECIFIED SYMPTOMS AND SIGNS INVOLVING THE GENITOURINARY SYSTEM: ICD-10-CM

## 2024-03-20 PROCEDURE — 99214 OFFICE O/P EST MOD 30 MIN: CPT

## 2024-03-20 NOTE — PHYSICAL EXAM
[Normal Appearance] : normal appearance [General Appearance - In No Acute Distress] : no acute distress [Well Groomed] : well groomed [Edema] : no peripheral edema [Respiration, Rhythm And Depth] : normal respiratory rhythm and effort [Exaggerated Use Of Accessory Muscles For Inspiration] : no accessory muscle use [Abdomen Soft] : soft [Abdomen Tenderness] : non-tender [Costovertebral Angle Tenderness] : no ~M costovertebral angle tenderness [Urinary Bladder Findings] : the bladder was normal on palpation [Normal Station and Gait] : the gait and station were normal for the patient's age [] : no rash [No Focal Deficits] : no focal deficits [Oriented To Time, Place, And Person] : oriented to person, place, and time [Affect] : the affect was normal [Mood] : the mood was normal

## 2024-03-20 NOTE — ASSESSMENT
[FreeTextEntry1] : 73 year old man with elevated PSA 4.46, MRI with 66 cc gland, 9 mm PIRADS 3 lesion in the left PZpm mid. Negative prostate biopsy 12/18/23. Plan for continued PSA screening.   - F/U PSA  - F/U in 1 year if PSA stable

## 2024-03-20 NOTE — HISTORY OF PRESENT ILLNESS
[FreeTextEntry1] : 73 yr old male presents to discuss prostate cancer screening. He had negative prostate biopsy in 12/2023.   PSA was 4.46 on 9/21/23. Prior to that in May 2023 it was 4.21. He denies any changes in his urinary symptoms, hematuria, or FH prostate cancer.   Has baseline LUTS. Nocturia x 2-3, frequency and occasional hesitancy. Denies weak stream or straining. Denies dysuria. He is mildly bothered by his symptoms, though they are stable.  MRI prostate 10/13/23: 66 cc gland, 9 mm PIRADS 3 lesion in the left PZpm mid  Prostate biopsy 12/18/23: Negative  PSA History: 9/21/23-- 4.46 5/8/23-- 4.21

## 2024-03-20 NOTE — LETTER BODY
[Dear  ___] : Dear  [unfilled], [Courtesy Letter:] : I had the pleasure of seeing your patient, [unfilled], in my office today. [Please see my note below.] : Please see my note below. [Consult Closing:] : Thank you very much for allowing me to participate in the care of this patient.  If you have any questions, please do not hesitate to contact me. [Sincerely,] : Sincerely, [FreeTextEntry3] : Farhat Douglass MD

## 2024-03-22 LAB — PSA SERPL-MCNC: 4.5 NG/ML

## 2024-04-22 ENCOUNTER — APPOINTMENT (OUTPATIENT)
Dept: HEART AND VASCULAR | Facility: CLINIC | Age: 74
End: 2024-04-22
Payer: COMMERCIAL

## 2024-04-22 VITALS
WEIGHT: 151.56 LBS | HEIGHT: 67.5 IN | BODY MASS INDEX: 23.51 KG/M2 | DIASTOLIC BLOOD PRESSURE: 69 MMHG | SYSTOLIC BLOOD PRESSURE: 135 MMHG | TEMPERATURE: 97.3 F | HEART RATE: 69 BPM | OXYGEN SATURATION: 100 %

## 2024-04-22 DIAGNOSIS — E78.00 PURE HYPERCHOLESTEROLEMIA, UNSPECIFIED: ICD-10-CM

## 2024-04-22 DIAGNOSIS — R06.02 SHORTNESS OF BREATH: ICD-10-CM

## 2024-04-22 PROCEDURE — 99214 OFFICE O/P EST MOD 30 MIN: CPT

## 2024-04-22 PROCEDURE — G2211 COMPLEX E/M VISIT ADD ON: CPT

## 2024-04-22 RX ORDER — ASPIRIN 81 MG/1
81 TABLET, COATED ORAL DAILY
Qty: 90 | Refills: 3 | Status: ACTIVE | COMMUNITY
Start: 2024-04-22

## 2024-04-22 NOTE — REASON FOR VISIT
[Symptom and Test Evaluation] : symptom and test evaluation [FreeTextEntry1] : 73 year old man with FH of CAD presents for a follow up and re-evaluation. Doing well with 20 mg dose of crestor. No chest pain noted.  Labs from 2/24 show improvement in lipids.

## 2024-04-22 NOTE — DISCUSSION/SUMMARY
[FreeTextEntry1] : CAD via Ca score cont ASA 81 mg QD Borderline HTN low Na diet and monitor HLD cont crestor at current dose and labs later in the year Carotid disease repeat carotid us in 6 months if able to approve and schedule. SOB seems to be better check echo on follow up if able to approve and schedule.

## 2024-05-01 NOTE — PLAN
[FreeTextEntry1] : follow up labs, labs drawn in office  encouraged healthy diet and exercise reviewed prior labs with patient   reviewed risks, benefits, side effects, alternatives, regimen to meds  will take only prn

## 2024-05-01 NOTE — HISTORY OF PRESENT ILLNESS
[FreeTextEntry1] : follow up  bp check  chol check   [de-identified] : 72 yo m presents to follow up chol, bp  Here to discuss cholesterol-- has been seeing cardio, on cholesterol meds.  Has been trying to eat well  also with anxiety when flying, interested in medication options

## 2024-05-01 NOTE — HEALTH RISK ASSESSMENT
[0] : 2) Feeling down, depressed, or hopeless: Not at all (0) [PHQ-2 Negative - No further assessment needed] : PHQ-2 Negative - No further assessment needed [Former] : Former [10-14] : 10-14 [> 15 Years] : > 15 Years [[id number]] : 0

## 2024-07-30 ENCOUNTER — APPOINTMENT (OUTPATIENT)
Dept: OTOLARYNGOLOGY | Facility: CLINIC | Age: 74
End: 2024-07-30
Payer: COMMERCIAL

## 2024-07-30 VITALS
OXYGEN SATURATION: 96 % | TEMPERATURE: 97.4 F | HEIGHT: 67.5 IN | BODY MASS INDEX: 23.42 KG/M2 | HEART RATE: 97 BPM | SYSTOLIC BLOOD PRESSURE: 116 MMHG | DIASTOLIC BLOOD PRESSURE: 76 MMHG | WEIGHT: 151 LBS

## 2024-07-30 DIAGNOSIS — H61.23 IMPACTED CERUMEN, BILATERAL: ICD-10-CM

## 2024-07-30 DIAGNOSIS — F45.8 OTHER SOMATOFORM DISORDERS: ICD-10-CM

## 2024-07-30 DIAGNOSIS — H90.3 SENSORINEURAL HEARING LOSS, BILATERAL: ICD-10-CM

## 2024-07-30 PROCEDURE — 92550 TYMPANOMETRY & REFLEX THRESH: CPT | Mod: 52

## 2024-07-30 PROCEDURE — 69210 REMOVE IMPACTED EAR WAX UNI: CPT

## 2024-07-30 PROCEDURE — 99203 OFFICE O/P NEW LOW 30 MIN: CPT | Mod: 25

## 2024-07-30 PROCEDURE — 92557 COMPREHENSIVE HEARING TEST: CPT

## 2024-07-30 NOTE — ASSESSMENT
[FreeTextEntry1] : HAE ordered; RTC for an ear cleaning in 6 months; annual audios, sooner prn any changes.  We discussed bruxism & its possible contribution to TMJD. Reviewed relaxation exercises & a possible trial of muscle relaxants as well as possible eventual botox.

## 2024-07-30 NOTE — HISTORY OF PRESENT ILLNESS
[de-identified] : Longstanding developing hearing loss; this may to some degree have been presents since childhood. No tinnitus. Denies: ear pain, drainage, frequent loud noise exp/shooting, frequent infections, hx of ear surgery or IV antibiotics/chemo; denies a FHx of hearing loss. Qtip use: yes Known bruxism including a cracked tooth; he has a . No TMJ pain.

## 2024-07-30 NOTE — DATA REVIEWED
[de-identified] : 7/24: mild to mod SNHL AD, mild to mod-sev SNHL AS; WR 90 AU - Immitance testing w/ type A AU

## 2024-07-30 NOTE — PHYSICAL EXAM
[Binocular Microscopic Exam] : Binocular microscopic exam was performed [Normal] : no masses and lesions seen, face is symmetric [FreeTextEntry8] : obstructing cerumen removed with suction [FreeTextEntry9] : obstructing cerumen removed with suction [de-identified] : ground-down incisors

## 2024-07-30 NOTE — CONSULT LETTER
[Dear  ___] : Dear  [unfilled], [Consult Letter:] : I had the pleasure of evaluating your patient, [unfilled]. [Please see my note below.] : Please see my note below. [Consult Closing:] : Thank you very much for allowing me to participate in the care of this patient.  If you have any questions, please do not hesitate to contact me. [Sincerely,] : Sincerely, [FreeTextEntry3] : GOPI Frank Jr, MD, FAAOHNS Otolaryngologist [facility]

## 2024-08-07 ENCOUNTER — APPOINTMENT (OUTPATIENT)
Dept: PHARMACY | Facility: CLINIC | Age: 74
End: 2024-08-07

## 2024-08-07 PROCEDURE — V5010 ASSESSMENT FOR HEARING AID: CPT

## 2024-10-21 ENCOUNTER — APPOINTMENT (OUTPATIENT)
Dept: HEART AND VASCULAR | Facility: CLINIC | Age: 74
End: 2024-10-21

## 2024-10-21 PROCEDURE — 93880 EXTRACRANIAL BILAT STUDY: CPT

## 2024-10-21 PROCEDURE — 93306 TTE W/DOPPLER COMPLETE: CPT

## 2024-10-29 ENCOUNTER — APPOINTMENT (OUTPATIENT)
Dept: HEART AND VASCULAR | Facility: CLINIC | Age: 74
End: 2024-10-29
Payer: MEDICARE

## 2024-10-29 VITALS
BODY MASS INDEX: 24.05 KG/M2 | WEIGHT: 155 LBS | HEIGHT: 67.5 IN | HEART RATE: 73 BPM | DIASTOLIC BLOOD PRESSURE: 71 MMHG | TEMPERATURE: 97.9 F | OXYGEN SATURATION: 97 % | SYSTOLIC BLOOD PRESSURE: 115 MMHG

## 2024-10-29 DIAGNOSIS — R06.02 SHORTNESS OF BREATH: ICD-10-CM

## 2024-10-29 DIAGNOSIS — E78.00 PURE HYPERCHOLESTEROLEMIA, UNSPECIFIED: ICD-10-CM

## 2024-10-29 PROCEDURE — 99204 OFFICE O/P NEW MOD 45 MIN: CPT

## 2024-10-29 PROCEDURE — G2211 COMPLEX E/M VISIT ADD ON: CPT

## 2025-01-21 ENCOUNTER — APPOINTMENT (OUTPATIENT)
Dept: OTOLARYNGOLOGY | Facility: CLINIC | Age: 75
End: 2025-01-21

## 2025-02-21 ENCOUNTER — APPOINTMENT (OUTPATIENT)
Dept: FAMILY MEDICINE | Facility: CLINIC | Age: 75
End: 2025-02-21
Payer: MEDICARE

## 2025-02-21 VITALS
SYSTOLIC BLOOD PRESSURE: 125 MMHG | DIASTOLIC BLOOD PRESSURE: 75 MMHG | HEIGHT: 67.5 IN | TEMPERATURE: 97.2 F | BODY MASS INDEX: 23.73 KG/M2 | HEART RATE: 66 BPM | WEIGHT: 153 LBS | OXYGEN SATURATION: 99 %

## 2025-02-21 DIAGNOSIS — R31.29 OTHER MICROSCOPIC HEMATURIA: ICD-10-CM

## 2025-02-21 DIAGNOSIS — R06.02 SHORTNESS OF BREATH: ICD-10-CM

## 2025-02-21 DIAGNOSIS — Z01.818 ENCOUNTER FOR OTHER PREPROCEDURAL EXAMINATION: ICD-10-CM

## 2025-02-21 DIAGNOSIS — B20 HUMAN IMMUNODEFICIENCY VIRUS [HIV] DISEASE: ICD-10-CM

## 2025-02-21 DIAGNOSIS — R21 RASH AND OTHER NONSPECIFIC SKIN ERUPTION: ICD-10-CM

## 2025-02-21 DIAGNOSIS — H61.23 IMPACTED CERUMEN, BILATERAL: ICD-10-CM

## 2025-02-21 DIAGNOSIS — Z00.00 ENCOUNTER FOR GENERAL ADULT MEDICAL EXAMINATION W/OUT ABNORMAL FINDINGS: ICD-10-CM

## 2025-02-21 DIAGNOSIS — H90.3 SENSORINEURAL HEARING LOSS, BILATERAL: ICD-10-CM

## 2025-02-21 DIAGNOSIS — R97.20 ELEVATED PROSTATE, SPECIFIC ANTIGEN [PSA]: ICD-10-CM

## 2025-02-21 DIAGNOSIS — E78.00 PURE HYPERCHOLESTEROLEMIA, UNSPECIFIED: ICD-10-CM

## 2025-02-21 DIAGNOSIS — F40.243 FEAR OF FLYING: ICD-10-CM

## 2025-02-21 DIAGNOSIS — R39.9 UNSPECIFIED SYMPTOMS AND SIGNS INVOLVING THE GENITOURINARY SYSTEM: ICD-10-CM

## 2025-02-21 PROCEDURE — 36415 COLL VENOUS BLD VENIPUNCTURE: CPT

## 2025-02-21 PROCEDURE — 99387 INIT PM E/M NEW PAT 65+ YRS: CPT

## 2025-02-22 LAB
ALBUMIN SERPL ELPH-MCNC: 4.5 G/DL
ALP BLD-CCNC: 61 U/L
ALT SERPL-CCNC: 19 U/L
ANION GAP SERPL CALC-SCNC: 10 MMOL/L
AST SERPL-CCNC: 23 U/L
BASOPHILS # BLD AUTO: 0.07 K/UL
BASOPHILS NFR BLD AUTO: 1 %
BILIRUB SERPL-MCNC: 0.4 MG/DL
BUN SERPL-MCNC: 26 MG/DL
CALCIUM SERPL-MCNC: 10 MG/DL
CHLORIDE SERPL-SCNC: 101 MMOL/L
CHOLEST SERPL-MCNC: 151 MG/DL
CO2 SERPL-SCNC: 27 MMOL/L
CREAT SERPL-MCNC: 1.07 MG/DL
EGFR: 72 ML/MIN/1.73M2
EOSINOPHIL # BLD AUTO: 0.26 K/UL
EOSINOPHIL NFR BLD AUTO: 3.8 %
ESTIMATED AVERAGE GLUCOSE: 114 MG/DL
GLUCOSE SERPL-MCNC: 97 MG/DL
HBA1C MFR BLD HPLC: 5.6 %
HCT VFR BLD CALC: 44.9 %
HDLC SERPL-MCNC: 74 MG/DL
HGB BLD-MCNC: 14.4 G/DL
IMM GRANULOCYTES NFR BLD AUTO: 0.3 %
LDLC SERPL CALC-MCNC: 65 MG/DL
LYMPHOCYTES # BLD AUTO: 1.85 K/UL
LYMPHOCYTES NFR BLD AUTO: 26.7 %
MAN DIFF?: NORMAL
MCHC RBC-ENTMCNC: 30.1 PG
MCHC RBC-ENTMCNC: 32.1 G/DL
MCV RBC AUTO: 93.9 FL
MONOCYTES # BLD AUTO: 0.63 K/UL
MONOCYTES NFR BLD AUTO: 9.1 %
NEUTROPHILS # BLD AUTO: 4.1 K/UL
NEUTROPHILS NFR BLD AUTO: 59.1 %
NONHDLC SERPL-MCNC: 77 MG/DL
PLATELET # BLD AUTO: 260 K/UL
POTASSIUM SERPL-SCNC: 4.9 MMOL/L
PROT SERPL-MCNC: 6.9 G/DL
PSA SERPL-MCNC: 3.86 NG/ML
RBC # BLD: 4.78 M/UL
RBC # FLD: 13.2 %
SODIUM SERPL-SCNC: 137 MMOL/L
TRIGL SERPL-MCNC: 60 MG/DL
TSH SERPL-ACNC: 1.65 UIU/ML
WBC # FLD AUTO: 6.93 K/UL

## 2025-03-18 ENCOUNTER — APPOINTMENT (OUTPATIENT)
Dept: INFECTIOUS DISEASE | Facility: CLINIC | Age: 75
End: 2025-03-18

## 2025-03-18 VITALS
BODY MASS INDEX: 23.73 KG/M2 | SYSTOLIC BLOOD PRESSURE: 118 MMHG | WEIGHT: 153 LBS | HEART RATE: 61 BPM | TEMPERATURE: 97.3 F | DIASTOLIC BLOOD PRESSURE: 72 MMHG | HEIGHT: 67.5 IN | OXYGEN SATURATION: 100 %

## 2025-03-18 DIAGNOSIS — B20 HUMAN IMMUNODEFICIENCY VIRUS [HIV] DISEASE: ICD-10-CM

## 2025-03-18 PROCEDURE — 99203 OFFICE O/P NEW LOW 30 MIN: CPT

## 2025-03-19 ENCOUNTER — NON-APPOINTMENT (OUTPATIENT)
Age: 75
End: 2025-03-19

## 2025-03-19 LAB
CD3 CELLS # BLD: 1676 CELLS/UL
CD3 CELLS NFR BLD: 84 %
CD3+CD4+ CELLS # BLD: 667 CELLS/UL
CD3+CD4+ CELLS NFR BLD: 33 %
CD3+CD4+ CELLS/CD3+CD8+ CLL SPEC: 0.68 RATIO
CD3+CD8+ CELLS # SPEC: 982 CELLS/UL
CD3+CD8+ CELLS NFR BLD: 49 %
HIV1 RNA # SERPL NAA+PROBE: ABNORMAL
HIV1 RNA # SERPL NAA+PROBE: ABNORMAL COPIES/ML
VIRAL LOAD INTERP: NORMAL
VIRAL LOAD LOG: ABNORMAL LG COP/ML

## 2025-04-09 ENCOUNTER — APPOINTMENT (OUTPATIENT)
Dept: DERMATOLOGY | Facility: CLINIC | Age: 75
End: 2025-04-09

## 2025-04-09 DIAGNOSIS — L82.1 OTHER SEBORRHEIC KERATOSIS: ICD-10-CM

## 2025-04-09 DIAGNOSIS — D22.9 MELANOCYTIC NEVI, UNSPECIFIED: ICD-10-CM

## 2025-04-09 DIAGNOSIS — L81.4 OTHER MELANIN HYPERPIGMENTATION: ICD-10-CM

## 2025-04-09 DIAGNOSIS — D18.01 HEMANGIOMA OF SKIN AND SUBCUTANEOUS TISSUE: ICD-10-CM

## 2025-04-09 DIAGNOSIS — Z12.83 ENCOUNTER FOR SCREENING FOR MALIGNANT NEOPLASM OF SKIN: ICD-10-CM

## 2025-04-09 PROCEDURE — 99203 OFFICE O/P NEW LOW 30 MIN: CPT

## 2025-07-11 ENCOUNTER — APPOINTMENT (OUTPATIENT)
Dept: HEART AND VASCULAR | Facility: CLINIC | Age: 75
End: 2025-07-11
Payer: MEDICARE

## 2025-07-11 VITALS
BODY MASS INDEX: 24.66 KG/M2 | TEMPERATURE: 97.9 F | WEIGHT: 159 LBS | DIASTOLIC BLOOD PRESSURE: 73 MMHG | HEART RATE: 63 BPM | SYSTOLIC BLOOD PRESSURE: 120 MMHG | OXYGEN SATURATION: 99 % | HEIGHT: 67.5 IN

## 2025-07-11 PROCEDURE — 99214 OFFICE O/P EST MOD 30 MIN: CPT

## 2025-07-11 PROCEDURE — G2211 COMPLEX E/M VISIT ADD ON: CPT

## 2025-07-11 PROCEDURE — 93978 VASCULAR STUDY: CPT

## (undated) DEVICE — SYR LUER LOK 10CC

## (undated) DEVICE — GRID BRACHYTHERAPY EZ 18G

## (undated) DEVICE — VENODYNE/SCD SLEEVE CALF MEDIUM

## (undated) DEVICE — GLV 7.5 PROTEXIS (WHITE)

## (undated) DEVICE — DRAPE MEDIUM SHEET 44" X 70"

## (undated) DEVICE — NDL BIOPSY CHIBA 22G X 20CM

## (undated) DEVICE — NDL MAX CORE 18G X 25CM

## (undated) DEVICE — PREP CHLORAPREP HI-LITE ORANGE 26ML

## (undated) DEVICE — BALLOON ENDOCAVITY 2X14CM

## (undated) DEVICE — PLASTIC SOLUTION BOWL 160Z

## (undated) DEVICE — DRAPE TOWEL BLUE 17" X 24"

## (undated) DEVICE — NDL HYPO REGULAR BEVEL 25G X 1.5" (BLUE)

## (undated) DEVICE — WARMING BLANKET UPPER ADULT

## (undated) DEVICE — SYR LUER LOK 50CC

## (undated) DEVICE — SYR CATH TIP 2 OZ

## (undated) DEVICE — DRSG TELFA 3 X 8